# Patient Record
Sex: MALE | Race: WHITE | NOT HISPANIC OR LATINO | Employment: FULL TIME | URBAN - NONMETROPOLITAN AREA
[De-identification: names, ages, dates, MRNs, and addresses within clinical notes are randomized per-mention and may not be internally consistent; named-entity substitution may affect disease eponyms.]

---

## 2017-05-07 ENCOUNTER — NON-PROVIDER VISIT (OUTPATIENT)
Dept: URGENT CARE | Facility: PHYSICIAN GROUP | Age: 32
End: 2017-05-07

## 2017-05-07 DIAGNOSIS — Z02.1 PRE-EMPLOYMENT DRUG SCREENING: ICD-10-CM

## 2017-05-07 LAB
BREATH ALCOHOL COMMENT: NORMAL
POC BREATHALIZER: 0 PERCENT (ref 0–0.01)

## 2017-05-07 PROCEDURE — 80305 DRUG TEST PRSMV DIR OPT OBS: CPT | Performed by: PHYSICIAN ASSISTANT

## 2017-05-07 PROCEDURE — 82075 ASSAY OF BREATH ETHANOL: CPT | Performed by: PHYSICIAN ASSISTANT

## 2018-03-17 ENCOUNTER — OFFICE VISIT (OUTPATIENT)
Dept: URGENT CARE | Facility: PHYSICIAN GROUP | Age: 33
End: 2018-03-17
Payer: COMMERCIAL

## 2018-03-17 VITALS
RESPIRATION RATE: 16 BRPM | OXYGEN SATURATION: 98 % | TEMPERATURE: 98 F | HEART RATE: 82 BPM | SYSTOLIC BLOOD PRESSURE: 120 MMHG | BODY MASS INDEX: 29.03 KG/M2 | WEIGHT: 196 LBS | HEIGHT: 69 IN | DIASTOLIC BLOOD PRESSURE: 78 MMHG

## 2018-03-17 DIAGNOSIS — J01.40 ACUTE PANSINUSITIS, RECURRENCE NOT SPECIFIED: ICD-10-CM

## 2018-03-17 PROCEDURE — 99203 OFFICE O/P NEW LOW 30 MIN: CPT | Performed by: PHYSICIAN ASSISTANT

## 2018-03-17 RX ORDER — AMOXICILLIN AND CLAVULANATE POTASSIUM 875; 125 MG/1; MG/1
1 TABLET, FILM COATED ORAL 2 TIMES DAILY
Qty: 14 TAB | Refills: 0 | Status: SHIPPED | OUTPATIENT
Start: 2018-03-17 | End: 2018-03-24

## 2018-03-20 ASSESSMENT — ENCOUNTER SYMPTOMS
CHILLS: 1
ABDOMINAL PAIN: 0
SINUS PRESSURE: 1
DIARRHEA: 0
HOARSE VOICE: 1
COUGH: 0
NECK PAIN: 0
WHEEZING: 0
DIZZINESS: 0
HEADACHES: 1
EYE DISCHARGE: 0
SORE THROAT: 0
SWOLLEN GLANDS: 0
MYALGIAS: 0
FEVER: 0
EYE REDNESS: 0
VOMITING: 0
TINGLING: 0
SINUS PAIN: 1

## 2018-03-20 NOTE — PROGRESS NOTES
"Subjective:      Josue Nguyen is a 33 y.o. male who presents with Sinus Problem            Sinus Problem   This is a new problem. Episode onset: 3 weeks ago. The problem has been gradually worsening since onset. There has been no fever. His pain is at a severity of 4/10. The pain is moderate. Associated symptoms include chills, congestion, headaches, a hoarse voice and sinus pressure. Pertinent negatives include no coughing, ear pain, neck pain, sore throat or swollen glands. Treatments tried: Mucinex, OTC allergy meds.       Review of Systems   Constitutional: Positive for chills and malaise/fatigue. Negative for fever.   HENT: Positive for congestion, hoarse voice, sinus pain and sinus pressure. Negative for ear discharge, ear pain and sore throat.         Pos. For ear pressure     Eyes: Negative for discharge and redness.   Respiratory: Negative for cough and wheezing.    Cardiovascular: Negative for chest pain and leg swelling.   Gastrointestinal: Negative for abdominal pain, diarrhea and vomiting.   Genitourinary: Negative for dysuria and urgency.   Musculoskeletal: Negative for myalgias and neck pain.   Skin: Negative for itching and rash.   Neurological: Positive for headaches. Negative for dizziness and tingling.          Objective:     /78   Pulse 82   Temp 36.7 °C (98 °F)   Resp 16   Ht 1.753 m (5' 9\")   Wt 88.9 kg (196 lb)   SpO2 98%   BMI 28.94 kg/m²    PMH:  has a past medical history of IBS (irritable bowel syndrome); IBS (irritable bowel syndrome) (2/25/2015); Sinusitis; and Sinusitis (2/25/2015).  MEDS:   Current Outpatient Prescriptions:   •  amoxicillin-clavulanate (AUGMENTIN) 875-125 MG Tab, Take 1 Tab by mouth 2 times a day for 7 days., Disp: 14 Tab, Rfl: 0  •  omeprazole (PRILOSEC) 20 MG delayed-release capsule, Take 1 Cap by mouth every day., Disp: 30 Cap, Rfl: 11  ALLERGIES: No Known Allergies  SURGHX: History reviewed. No pertinent surgical history.  SOCHX:  reports that he has " never smoked. He has never used smokeless tobacco. He reports that he does not drink alcohol or use drugs.  FH: Family history was reviewed, no pertinent findings to report    Physical Exam   Constitutional: He is oriented to person, place, and time. He appears well-developed and well-nourished.   HENT:   Head: Normocephalic and atraumatic.   Mouth/Throat: No oropharyngeal exudate.   Bilateral clear effusions- without bulge or erythema to the TM.   Posterior oropharynx with pos. PND without tonsillar edema or erythema.   Nose- boggy turbinates with mild-moderate amount of nasal discharge. Bilateral maxillary and frontal sinus tenderness with percussion.    Eyes: EOM are normal. Pupils are equal, round, and reactive to light.   Neck: Normal range of motion. Neck supple.   Cardiovascular: Normal rate and regular rhythm.    Pulmonary/Chest: Effort normal and breath sounds normal. No respiratory distress. He has no wheezes.   Musculoskeletal: Normal range of motion. He exhibits no edema.   Lymphadenopathy:     He has no cervical adenopathy.   Neurological: He is alert and oriented to person, place, and time.   Skin: Skin is warm. No rash noted.   Psychiatric: He has a normal mood and affect. His behavior is normal.   Vitals reviewed.              Assessment/Plan:     1. Acute pansinusitis, recurrence not specified  - amoxicillin-clavulanate (AUGMENTIN) 875-125 MG Tab; Take 1 Tab by mouth 2 times a day for 7 days.  Dispense: 14 Tab; Refill: 0    Due to duration of symptoms, sinus tenderness, and failure of OTC therapies- ABX was written to tx. For bacterial etiology for sinusitis.   Continue OTC supportive therapies- Flonase, OTC allergy meds, avoid night time dairy. Increase fluids. Humidification.   Patient given precautionary s/sx that mandate immediate follow up and evaluation in the ED. Advised of risks of not doing so.    DDX, Supportive care, and indications for immediate follow-up discussed with patient.     Instructed to return to clinic or nearest emergency department if we are not available for any change in condition, further concerns, or worsening of symptoms.    The patient demonstrated a good understanding and agreed with the treatment plan

## 2019-11-15 ENCOUNTER — OFFICE VISIT (OUTPATIENT)
Dept: URGENT CARE | Facility: PHYSICIAN GROUP | Age: 34
End: 2019-11-15

## 2019-11-15 VITALS
BODY MASS INDEX: 29.64 KG/M2 | RESPIRATION RATE: 18 BRPM | DIASTOLIC BLOOD PRESSURE: 84 MMHG | SYSTOLIC BLOOD PRESSURE: 128 MMHG | HEIGHT: 69 IN | HEART RATE: 127 BPM | OXYGEN SATURATION: 97 % | TEMPERATURE: 101.1 F | WEIGHT: 200.1 LBS

## 2019-11-15 DIAGNOSIS — J11.1 INFLUENZA-LIKE ILLNESS: ICD-10-CM

## 2019-11-15 LAB
FLUAV+FLUBV AG SPEC QL IA: NEGATIVE
INT CON NEG: NEGATIVE
INT CON POS: POSITIVE

## 2019-11-15 PROCEDURE — 99214 OFFICE O/P EST MOD 30 MIN: CPT | Performed by: PHYSICIAN ASSISTANT

## 2019-11-15 PROCEDURE — 87804 INFLUENZA ASSAY W/OPTIC: CPT | Performed by: PHYSICIAN ASSISTANT

## 2019-11-15 RX ORDER — OSELTAMIVIR PHOSPHATE 75 MG/1
75 CAPSULE ORAL 2 TIMES DAILY
Qty: 10 CAP | Refills: 0 | Status: SHIPPED | OUTPATIENT
Start: 2019-11-15 | End: 2020-08-13

## 2019-11-15 ASSESSMENT — ENCOUNTER SYMPTOMS
SPUTUM PRODUCTION: 0
COUGH: 1
FOCAL WEAKNESS: 0
FEVER: 1
SENSORY CHANGE: 0
SINUS PAIN: 0
WHEEZING: 0
VOMITING: 0
CHILLS: 1
DIARRHEA: 0
TINGLING: 0
ABDOMINAL PAIN: 0
PALPITATIONS: 0
MYALGIAS: 1
HEADACHES: 1
SORE THROAT: 0
NAUSEA: 1
SHORTNESS OF BREATH: 0

## 2019-11-15 NOTE — LETTER
November 15, 2019         Patient: Josue Nguyen   YOB: 1985   Date of Visit: 11/15/2019           To Whom it May Concern:    Josue Nguyen was seen in my clinic on 11/15/2019. He is excused from work from 11/15/19-11/18/19. He may return to work on 11/19/19 if feeling better.    If you have any questions or concerns, please don't hesitate to call.        Sincerely,           Jo Silva P.A.-C.  Electronically Signed

## 2019-11-15 NOTE — PROGRESS NOTES
"Subjective:      Josue Nguyen is a 34 y.o. male who presents with Cough; Headache; Fever (as high as 104); and Generalized Body Aches            Fever    This is a new problem. The current episode started yesterday. The problem occurs constantly. The problem has been unchanged. The maximum temperature noted was more than 104 F. The temperature was taken using an oral thermometer. Associated symptoms include congestion, coughing (mild ), headaches, muscle aches and nausea. Pertinent negatives include no abdominal pain, chest pain, diarrhea, ear pain, rash, sore throat, vomiting or wheezing. Treatments tried: Mucinex  The treatment provided no relief.       Past Medical History:   Diagnosis Date   • IBS (irritable bowel syndrome)    • IBS (irritable bowel syndrome) 2/25/2015   • Sinusitis    • Sinusitis 2/25/2015       No past surgical history on file.    Family History   Problem Relation Age of Onset   • Cancer Neg Hx    • Diabetes Neg Hx        No Known Allergies    Medications, Allergies, and current problem list reviewed today in Epic      Review of Systems   Constitutional: Positive for chills, fever and malaise/fatigue.   HENT: Positive for congestion. Negative for ear discharge, ear pain, sinus pain and sore throat.    Respiratory: Positive for cough (mild ). Negative for sputum production, shortness of breath and wheezing.    Cardiovascular: Negative for chest pain, palpitations and leg swelling.   Gastrointestinal: Positive for nausea. Negative for abdominal pain, diarrhea and vomiting.   Musculoskeletal: Positive for myalgias.   Skin: Negative for rash.   Neurological: Positive for headaches. Negative for tingling, sensory change and focal weakness.     All other systems reviewed and are negative.        Objective:     /84   Pulse (!) 127   Temp (!) 38.4 °C (101.1 °F) (Temporal)   Resp 18   Ht 1.753 m (5' 9\")   Wt 90.8 kg (200 lb 1.6 oz)   SpO2 97%   BMI 29.55 kg/m²      Physical " Exam  Constitutional:       General: He is not in acute distress.  HENT:      Head: Normocephalic and atraumatic.      Right Ear: Tympanic membrane, ear canal and external ear normal.      Left Ear: Tympanic membrane, ear canal and external ear normal.      Nose: Congestion and rhinorrhea present.      Mouth/Throat:      Mouth: Mucous membranes are moist.      Pharynx: Posterior oropharyngeal erythema present.   Cardiovascular:      Rate and Rhythm: Normal rate and regular rhythm.      Heart sounds: Normal heart sounds. No murmur. No friction rub. No gallop.    Pulmonary:      Effort: Pulmonary effort is normal. No respiratory distress.      Breath sounds: Normal breath sounds. No wheezing, rhonchi or rales.   Musculoskeletal: Normal range of motion.   Skin:     General: Skin is warm and dry.      Findings: No rash.   Neurological:      General: No focal deficit present.      Mental Status: He is alert and oriented to person, place, and time.   Psychiatric:         Mood and Affect: Mood normal.         Behavior: Behavior normal.         Thought Content: Thought content normal.         Judgment: Judgment normal.                 Assessment/Plan:       1. Influenza-like illness  POCT Influenza A/B    oseltamivir (TAMIFLU) 75 MG Cap       poct influenza- negative   Patient presents with influenza like illness,  Will treat with Tamiflu     Differential diagnoses, Supportive care, and indications for immediate follow-up discussed with patient.   Instructed to return to clinic or nearest emergency department for any change in condition, further concerns, or worsening of symptoms.    The patient demonstrated a good understanding and agreed with the treatment plan.    Jo Silva P.A.-C.

## 2020-06-23 ENCOUNTER — OFFICE VISIT (OUTPATIENT)
Dept: URGENT CARE | Facility: PHYSICIAN GROUP | Age: 35
End: 2020-06-23

## 2020-06-23 VITALS
SYSTOLIC BLOOD PRESSURE: 124 MMHG | HEART RATE: 102 BPM | DIASTOLIC BLOOD PRESSURE: 82 MMHG | OXYGEN SATURATION: 97 % | BODY MASS INDEX: 28.29 KG/M2 | TEMPERATURE: 98.2 F | HEIGHT: 69 IN | WEIGHT: 191 LBS | RESPIRATION RATE: 16 BRPM

## 2020-06-23 DIAGNOSIS — L55.9 SUNBURN: ICD-10-CM

## 2020-06-23 DIAGNOSIS — L25.9 CONTACT DERMATITIS, UNSPECIFIED CONTACT DERMATITIS TYPE, UNSPECIFIED TRIGGER: ICD-10-CM

## 2020-06-23 PROCEDURE — 99214 OFFICE O/P EST MOD 30 MIN: CPT | Performed by: PHYSICIAN ASSISTANT

## 2020-06-23 RX ORDER — IBUPROFEN 800 MG/1
800 TABLET ORAL EVERY 8 HOURS PRN
Qty: 30 TAB | Refills: 0 | Status: SHIPPED | OUTPATIENT
Start: 2020-06-23 | End: 2020-08-13

## 2020-06-23 RX ORDER — TRIAMCINOLONE ACETONIDE 1 MG/G
1 CREAM TOPICAL 2 TIMES DAILY
Qty: 1 TUBE | Refills: 0 | Status: SHIPPED | OUTPATIENT
Start: 2020-06-23 | End: 2020-08-13

## 2020-06-23 ASSESSMENT — ENCOUNTER SYMPTOMS
VOMITING: 0
TREMORS: 0
HEADACHES: 0
CHILLS: 0
COUGH: 0
DOUBLE VISION: 0
MYALGIAS: 0
DIZZINESS: 0
FATIGUE: 0
PALPITATIONS: 0
BLURRED VISION: 0
NAUSEA: 0
TINGLING: 0
ABDOMINAL PAIN: 0
WEAKNESS: 0

## 2020-06-23 NOTE — PATIENT INSTRUCTIONS
Sunburn  Sunburn is damage to the skin that is caused by overexposure to ultraviolet (UV) rays. Repeated sun exposure causes early skin aging, such as wrinkles and sun spots. It also increases the risk of skin cancer.   CAUSES  Sunburn is caused by getting too much UV radiation from the sun.  RISK FACTORS  The following factors may make you more likely to develop this condition:  · Having a family history of sensitivity to the sun.  · Having certain diseases, such as lupus.  · Taking certain medicines.  · Using certain cosmetics.  · Having light-colored skin (light complexion).  SYMPTOMS  Symptoms of this condition include:  · Red or pink skin.  · Soreness and swelling of the affected areas.  · Pain.  · Blisters.  · Peeling skin.  You may also have a headache, fever, or fatigue if the sunburn covers a large part of your body.  DIAGNOSIS  This condition is diagnosed with a medical history and physical exam.  TREATMENT  Treatment focuses on managing your symptoms. Treatment may include:  · Medicines to reduce swelling.  · Steroid medicines to help with inflammation and itching. These may be applied as creams or taken by mouth (orally).  · Antibiotic cream or ointment to apply to any blisters that break open.  HOME CARE INSTRUCTIONS  Medicines   · Take or apply over-the-counter and prescription medicines only as told by your health care provider.  · If you were prescribed an antibiotic medicine, use it as told by your health care provider. Do not stop using the antibiotic even if your condition improves.  General Instructions   · Avoid further exposure to the sun. Protect sunburned skin by wearing clothing that covers the injured skin.  · Do not put ice on your sunburn. This can cause further damage. Try taking a cool bath or applying a cool, wet cloth (cool compress) to your skin. This may help with pain.  · Drink enough fluid to keep your urine clear or pale yellow.  · Try applying aloe vera or a moisturizer that has  soy in it to your sunburn. This may help. Do not apply aloe vera or moisturizer with soy if your sunburn has blisters.  · Do not break any blisters that you may have.  PREVENTION  · Try to avoid the sun between 10:00 a.m. and 2:00 p.m. when it is the strongest.  · Apply sunscreen at least 15 minutes before exposure to the sun.  · Apply a sunscreen with an SPF of 15 or higher. Consider using an SPF of 30 or higher if you will be exposed to the sun for prolonged periods of time. Use a sunscreen that protects against all of the sun's rays (broad-spectrum) and is water-resistant.  · Reapply sunscreen:  ¨ About every two hours during sun exposure.  ¨ More often when sweating a lot while out in the sun.  ¨ After getting wet from swimming or playing in water.  · Wear long sleeves, a hat, and sunglasses that block UV light when you are outside.  · Talk with your health care provider about medicines, herbs, and foods that can make you more sensitive to light. Avoid these, if possible.  · Do not use tanning beds.  SEEK MEDICAL CARE IF:  · You have a fever.  · Your symptoms do not improve with treatment.  · Your pain is not controlled with medicine.  · Your burn becomes more painful and swollen.  SEEK IMMEDIATE MEDICAL CARE IF:  · You start to vomit or have diarrhea.  · You feel faint or you pass out.  · You have a headache and you feel confused.  · You develop severe blistering.  · You have pus or fluid coming from the blisters.  This information is not intended to replace advice given to you by your health care provider. Make sure you discuss any questions you have with your health care provider.  Document Released: 09/27/2006 Document Revised: 04/10/2017 Document Reviewed: 06/20/2016  Elsevier Interactive Patient Education © 2017 [employer]

## 2020-06-23 NOTE — PROGRESS NOTES
Subjective:   Josue Nguyen is a 35 y.o. male who presents for Rash (arms,legs, trunk) and Sunburn      Sunburn   This is a new (sunday.  Patient has mild blistering of the bilateral shoulders.  Also is complaining of lower leg and ankle swelling.) problem. The problem has been unchanged. Associated symptoms include a rash (Patient also has a nonrelated rash to the inner thighs and inner arm starting 2 weeks ago describes it as itchy). Pertinent negatives include no abdominal pain, chest pain, chills, coughing, fatigue, headaches, myalgias, nausea, vomiting or weakness. Nothing aggravates the symptoms. He has tried NSAIDs for the symptoms. The treatment provided mild relief.       Review of Systems   Constitutional: Negative for chills and fatigue.   Eyes: Negative for blurred vision and double vision.   Respiratory: Negative for cough.    Cardiovascular: Negative for chest pain and palpitations.   Gastrointestinal: Negative for abdominal pain, nausea and vomiting.   Musculoskeletal: Negative for myalgias.   Skin: Positive for rash (Patient also has a nonrelated rash to the inner thighs and inner arm starting 2 weeks ago describes it as itchy).   Neurological: Negative for dizziness, tingling, tremors, weakness and headaches.       Medications:    • ibuprofen Tabs  • mupirocin Oint  • omeprazole  • oseltamivir Caps  • triamcinolone acetonide Crea    Allergies: Patient has no known allergies.    Problem List: Josue Nguyen has IBS (irritable bowel syndrome) and Sinusitis on their problem list.    Surgical History:  No past surgical history on file.    Past Social Hx: Josue Nguyen  reports that he has never smoked. He has never used smokeless tobacco. He reports that he does not drink alcohol or use drugs.     Past Family Hx:  Josue Nguyen family history is not on file.     Problem list, medications, and allergies reviewed by myself today in Epic.     Objective:     /82   Pulse (!) 102   Temp 36.8 °C (98.2 °F)  "(Temporal)   Resp 16   Ht 1.753 m (5' 9\")   Wt 86.6 kg (191 lb)   SpO2 97%   BMI 28.21 kg/m²     Physical Exam  Constitutional:       General: He is not in acute distress.     Appearance: Normal appearance. He is not ill-appearing, toxic-appearing or diaphoretic.   HENT:      Head: Normocephalic and atraumatic.      Nose: Nose normal. No congestion or rhinorrhea.      Mouth/Throat:      Mouth: Mucous membranes are moist.      Pharynx: No oropharyngeal exudate or posterior oropharyngeal erythema.   Eyes:      Conjunctiva/sclera: Conjunctivae normal.   Neck:      Musculoskeletal: Normal range of motion. No muscular tenderness.   Cardiovascular:      Rate and Rhythm: Normal rate and regular rhythm.      Pulses: Normal pulses.      Heart sounds: Normal heart sounds.   Pulmonary:      Effort: Pulmonary effort is normal.      Breath sounds: Normal breath sounds. No wheezing.   Abdominal:      Palpations: Abdomen is soft.      Tenderness: There is no abdominal tenderness.   Musculoskeletal:      Right lower leg: Edema present.      Left lower leg: Edema present.   Lymphadenopathy:      Cervical: No cervical adenopathy.   Skin:     General: Skin is warm and dry.      Capillary Refill: Capillary refill takes less than 2 seconds.      Findings: Erythema present.      Comments: Erythema over the arms back chest and legs.  Swelling of the bilateral ankles.  Mild blistering of the bilateral shoulders.  Signs of infection.    Small rash on the inner thighs patient states was present before the sunburn.  Small papules appreciated no signs of infection no vesicular lesions.  No crusting or scaling.   Neurological:      Mental Status: He is alert.   Psychiatric:         Mood and Affect: Mood normal.         Thought Content: Thought content normal.           Assessment/Plan:     Diagnosis and associated orders:     1. Sunburn  ibuprofen (MOTRIN) 800 MG Tab    mupirocin (BACTROBAN) 2 % Ointment   2. Contact dermatitis, " unspecified contact dermatitis type, unspecified trigger  triamcinolone acetonide (KENALOG) 0.1 % Cream      Comments/MDM:       • After visit summary provided with skin care instructions.  • May take ibuprofen twice daily as needed for pain and inflammation.  • May take Tylenol every 8 hours as needed for pain do not exceed 3000 mg/day.  • After some prone has resolved if rash is still present may apply topical steroid twice a day.  Take as directed.  • If blisters open up apply topical Bactroban twice a day.  • Red flag symptoms and signs of infection discussed with the patient today if any of these present return to the clinic.           Differential diagnosis, natural history, supportive care, and indications for immediate follow-up discussed.    Advised the patient to follow-up with the primary care physician for recheck, reevaluation, and consideration of further management.    Please note that this dictation was created using voice recognition software. I have made reasonable attempt to correct obvious errors, but I expect that there are errors of grammar and possibly content that I did not discover before finalizing the note.    This note was electronically signed by MENG Lopez PA-C

## 2020-08-13 ENCOUNTER — OFFICE VISIT (OUTPATIENT)
Dept: URGENT CARE | Facility: PHYSICIAN GROUP | Age: 35
End: 2020-08-13
Payer: COMMERCIAL

## 2020-08-13 VITALS
TEMPERATURE: 98.5 F | HEART RATE: 75 BPM | RESPIRATION RATE: 16 BRPM | OXYGEN SATURATION: 98 % | WEIGHT: 189 LBS | BODY MASS INDEX: 27.99 KG/M2 | HEIGHT: 69 IN | SYSTOLIC BLOOD PRESSURE: 118 MMHG | DIASTOLIC BLOOD PRESSURE: 72 MMHG

## 2020-08-13 DIAGNOSIS — H10.9 BACTERIAL CONJUNCTIVITIS OF RIGHT EYE: ICD-10-CM

## 2020-08-13 PROCEDURE — 99214 OFFICE O/P EST MOD 30 MIN: CPT | Performed by: PHYSICIAN ASSISTANT

## 2020-08-13 RX ORDER — POLYMYXIN B SULFATE AND TRIMETHOPRIM 1; 10000 MG/ML; [USP'U]/ML
1 SOLUTION OPHTHALMIC EVERY 4 HOURS
Qty: 10 ML | Refills: 0 | Status: SHIPPED | OUTPATIENT
Start: 2020-08-13 | End: 2020-08-20

## 2020-08-13 RX ORDER — ERYTHROMYCIN 5 MG/G
1 OINTMENT OPHTHALMIC 4 TIMES DAILY
Qty: 1 PACKET | Refills: 0 | Status: SHIPPED | OUTPATIENT
Start: 2020-08-13 | End: 2020-08-18

## 2020-08-13 SDOH — HEALTH STABILITY: MENTAL HEALTH: HOW OFTEN DO YOU HAVE 6 OR MORE DRINKS ON ONE OCCASION?: LESS THAN MONTHLY

## 2020-08-13 ASSESSMENT — ENCOUNTER SYMPTOMS
BLURRED VISION: 0
EYE DISCHARGE: 1
DOUBLE VISION: 0
CHILLS: 0
EYE PAIN: 0
EYE REDNESS: 1
FEVER: 0

## 2020-08-13 ASSESSMENT — VISUAL ACUITY: OU: 1

## 2020-08-14 NOTE — PROGRESS NOTES
"  Subjective:   Josue Nguyen is a 35 y.o. male who presents today with   Chief Complaint   Patient presents with   • Eye Drainage     x 2 days       Conjunctivitis  This is a new problem. Episode onset: 2 days. The problem occurs constantly. The problem has been unchanged. Pertinent negatives include no chills, congestion or fever. Nothing aggravates the symptoms. He has tried nothing for the symptoms. The treatment provided no relief.     Patient denies any injury or trauma to the eye recently.  He states he woke up with eye very crusted this morning.  She does not wear contacts or glasses.  PMH:  has a past medical history of IBS (irritable bowel syndrome), IBS (irritable bowel syndrome) (2/25/2015), Sinusitis, and Sinusitis (2/25/2015).  MEDS:   Current Outpatient Medications:   •  polymixin-trimethoprim (POLYTRIM) 49836-5.1 UNIT/ML-% Solution, Place 1 Drop in right eye every 4 hours for 7 days., Disp: 10 mL, Rfl: 0  •  erythromycin 5 MG/GM Ointment, Place 1 Application in right eye 4 times a day for 5 days., Disp: 1 Packet, Rfl: 0  ALLERGIES: No Known Allergies  SURGHX: History reviewed. No pertinent surgical history.  SOCHX:  reports that he has never smoked. He has never used smokeless tobacco. He reports that he does not drink alcohol or use drugs.  FH: Reviewed with patient, not pertinent to this visit.       Review of Systems   Constitutional: Negative for chills and fever.   HENT: Negative for congestion.    Eyes: Positive for discharge and redness. Negative for blurred vision, double vision and pain.   All other systems reviewed and are negative.       Objective:   /72   Pulse 75   Temp 36.9 °C (98.5 °F) (Temporal)   Resp 16   Ht 1.753 m (5' 9\")   Wt 85.7 kg (189 lb)   SpO2 98%   BMI 27.91 kg/m²   Physical Exam  Vitals signs and nursing note reviewed.   Constitutional:       General: He is not in acute distress.     Appearance: He is well-developed.   HENT:      Head: Normocephalic and " atraumatic.      Right Ear: Hearing normal.      Left Ear: Hearing normal.   Eyes:      General: Vision grossly intact.         Right eye: Discharge (purulent discharge) present. No foreign body.      Conjunctiva/sclera:      Right eye: Right conjunctiva is injected.      Pupils: Pupils are equal, round, and reactive to light.      Comments: Erythema to inner portion of right lower eyelid.  No uptake noted with fluorescein stain and Woods lamp   Cardiovascular:      Rate and Rhythm: Normal rate and regular rhythm.      Heart sounds: Normal heart sounds.   Pulmonary:      Effort: Pulmonary effort is normal.   Musculoskeletal:      Comments: Normal movement in all 4 extremities   Skin:     General: Skin is warm and dry.             Comments: Dry eczematous rash under the right eye no vesicles or pustules   Neurological:      Mental Status: He is alert.      Coordination: Coordination normal.   Psychiatric:         Mood and Affect: Mood normal.       Patient states rash under his right eye has been present for the past 2 years and has not noticed any worsening.    Assessment/Plan:   Assessment    1. Bacterial conjunctivitis of right eye  - polymixin-trimethoprim (POLYTRIM) 71186-7.1 UNIT/ML-% Solution; Place 1 Drop in right eye every 4 hours for 7 days.  Dispense: 10 mL; Refill: 0  - erythromycin 5 MG/GM Ointment; Place 1 Application in right eye 4 times a day for 5 days.  Dispense: 1 Packet; Refill: 0  Encouraged patient to wipe down his sunglasses.  Patient will use eyedrops initially and if still present he will use the ointment after 5 to 7 days.  Patient states he would like to use drops as he does not want to have his eyes goopy while at work.  Differential diagnosis, natural history, supportive care, and indications for immediate follow-up discussed.   Patient given instructions and understanding of medications and treatment.    If not improving in 3-5 days, F/U with PCP or return to UC if symptoms  worsen.  Follow-up with eye doctor with any vision changes.  Patient agreeable to plan.      Please note that this dictation was created using voice recognition software. I have made every reasonable attempt to correct obvious errors, but I expect that there are errors of grammar and possibly content that I did not discover before finalizing the note.    William Hernandez PA-C